# Patient Record
Sex: MALE | Race: WHITE | NOT HISPANIC OR LATINO | Employment: OTHER | URBAN - METROPOLITAN AREA
[De-identification: names, ages, dates, MRNs, and addresses within clinical notes are randomized per-mention and may not be internally consistent; named-entity substitution may affect disease eponyms.]

---

## 2017-05-07 ENCOUNTER — OFFICE VISIT (OUTPATIENT)
Dept: URGENT CARE | Facility: CLINIC | Age: 67
End: 2017-05-07

## 2017-05-07 VITALS
HEIGHT: 70 IN | WEIGHT: 216 LBS | DIASTOLIC BLOOD PRESSURE: 82 MMHG | TEMPERATURE: 98.1 F | OXYGEN SATURATION: 96 % | BODY MASS INDEX: 30.92 KG/M2 | RESPIRATION RATE: 16 BRPM | SYSTOLIC BLOOD PRESSURE: 132 MMHG | HEART RATE: 76 BPM
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
## 2017-05-07 PROCEDURE — 99202 OFFICE O/P NEW SF 15 MIN: CPT | Performed by: PHYSICIAN ASSISTANT

## 2017-05-07 RX ORDER — RAMIPRIL 5 MG/1
5 CAPSULE ORAL DAILY
COMMUNITY

## 2017-05-07 RX ORDER — SIMVASTATIN 20 MG
20 TABLET ORAL NIGHTLY
COMMUNITY

## 2017-05-07 RX ORDER — CEPHALEXIN 500 MG/1
500 CAPSULE ORAL 4 TIMES DAILY
Qty: 28 CAP | Refills: 0 | Status: SHIPPED | OUTPATIENT
Start: 2017-05-07 | End: 2017-05-14

## 2017-05-07 RX ORDER — DIPHENHYDRAMINE HCL 25 MG
25-50 CAPSULE ORAL EVERY 6 HOURS PRN
Qty: 30 CAP | Refills: 0 | Status: SHIPPED | OUTPATIENT
Start: 2017-05-07

## 2017-05-07 RX ORDER — AMLODIPINE BESYLATE 5 MG/1
5 TABLET ORAL DAILY
COMMUNITY

## 2017-05-07 RX ORDER — METOPROLOL TARTRATE 100 MG/1
100 TABLET ORAL 2 TIMES DAILY
COMMUNITY

## 2017-05-07 ASSESSMENT — ENCOUNTER SYMPTOMS
DIARRHEA: 0
FOCAL WEAKNESS: 0
NAUSEA: 0
TINGLING: 0
STRIDOR: 0
SHORTNESS OF BREATH: 0
CHILLS: 0
SENSORY CHANGE: 0
WHEEZING: 0
COUGH: 0
VOMITING: 0
FEVER: 0
ABDOMINAL PAIN: 0

## 2017-05-07 NOTE — MR AVS SNAPSHOT
"        Sandro Diamond   2017 12:15 PM   Office Visit   MRN: [id number]    Department:  [facility]   Dept Phone:  [phone]    Description:  Male : 1950   Provider:  Antonio Chan PA-C           Reason for Visit     Insect Bite           Allergies as of 2017     No Known Allergies      You were diagnosed with     Insect bite, initial encounter   [[id number]]         Vital Signs     Blood Pressure Pulse Temperature Respirations Height Weight    132/82 mmHg 76 36.7 °C (98.1 °F) 16 1.778 m (5' 10\") 97.977 kg (216 lb)    Body Mass Index Oxygen Saturation                30.99 kg/m2 96%          Basic Information     Date Of Birth Sex Race Ethnicity Preferred Language    1950 Male White Non- English      Health Maintenance        Date Due Completion Dates    IMM DTaP/Tdap/Td Vaccine (1 - Tdap) 3/24/1969 ---    COLONOSCOPY 3/24/2000 ---    IMM ZOSTER VACCINE 3/24/2010 ---    IMM PNEUMOCOCCAL 65+ (ADULT) LOW/MEDIUM RISK SERIES (1 of 2 - PCV13) 3/24/2015 ---            Current Immunizations     No immunizations on file.      Below and/or attached are the medications your provider expects you to take. Review all of your home medications and newly ordered medications with your provider and/or pharmacist. Follow medication instructions as directed by your provider and/or pharmacist. Please keep your medication list with you and share with your provider. Update the information when medications are discontinued, doses are changed, or new medications (including over-the-counter products) are added; and carry medication information at all times in the event of emergency situations     Allergies:  No Known Allergies          Medications  Valid as of: May 07, 2017 - 12:44 PM    Generic Name Brand Name Tablet Size Instructions for use    AmLODIPine Besylate (Tab) NORVASC 5 MG Take 5 mg by mouth every day.        Cephalexin (Cap) KEFLEX 500 MG Take 1 Cap by mouth 4 times a day for 7 days.       " DiphenhydrAMINE HCl (Cap) BENADRYL 25 MG Take 1-2 Caps by mouth every 6 hours as needed for Itching or Rash.        Metoprolol Tartrate (Tab) LOPRESSOR 100 MG Take 100 mg by mouth 2 times a day.        Ramipril (Cap) ALTACE 5 MG Take 5 mg by mouth every day.        Rivaroxaban (Tab) XARELTO 20 MG Take 20 mg by mouth with dinner.        Simvastatin (Tab) ZOCOR 20 MG Take 20 mg by mouth every evening.        .                 Medicines prescribed today were sent to:     [facility] [address] [place] [address], [place] - [address]    [address] [place] [address]    Phone: [phone] Fax: [phone]    Open 24 Hours?: No      Medication refill instructions:       If your prescription bottle indicates you have medication refills left, it is not necessary to call your provider’s office. Please contact your pharmacy and they will refill your medication.    If your prescription bottle indicates you do not have any refills left, you may request refills at any time through one of the following ways: The online Spontaneously system (except Urgent Care), by calling your provider’s office, or by asking your pharmacy to contact your provider’s office with a refill request. Medication refills are processed only during regular business hours and may not be available until the next business day. Your provider may request additional information or to have a follow-up visit with you prior to refilling your medication.   *Please Note: Medication refills are assigned a new Rx number when refilled electronically. Your pharmacy may indicate that no refills were authorized even though a new prescription for the same medication is available at the pharmacy. Please request the medicine by name with the pharmacy before contacting your provider for a refill.           Spontaneously Access Code: [email or web address]  Expires: 6/6/2017 12:10 PM    Your email address is not on file at [facility].  Email Addresses are required for you to sign up for  WangYou, please contact [phone] to verify your personal information and to provide your email address prior to attempting to register for WangYou.    Sandro Diamond  [facility], [address], [address]  [facility]    [employer]  A secure, online tool to manage your health information     [employer]’s WangYou® is a secure, online tool that connects you to your personalized health information from the privacy of your home -- day or night - making it very easy for you to manage your healthcare. Once the activation process is completed, you can even access your medical information using the WangYou nader, which is available for free in the Apple Nader store or Google Play store.     To learn more about WangYou, visit [email or web address]    There are two levels of access available (as shown below):   My Chart Features  [facility] Primary Care Doctor [facility]  Specialists [facility]  Urgent  Care Non-[facility] Primary Care Doctor   Email your healthcare team securely and privately 24/7 X X X    Manage appointments: schedule your next appointment; view details of past/upcoming appointments X      Request prescription refills. X      View recent personal medical records, including lab and immunizations X X X X   View health record, including health history, allergies, medications X X X X   Read reports about your outpatient visits, procedures, consult and ER notes X X X X   See your discharge summary, which is a recap of your hospital and/or ER visit that includes your diagnosis, lab results, and care plan X X  X     How to register for WangYou:  Once your e-mail address has been verified, follow the following steps to sign up for WangYou.     1. Go to  [email or web address]  2. Click on the Sign Up Now box, which takes you to the New Member Sign Up page. You will need to provide the following information:  a. Enter your WangYou Access Code exactly as it appears at the top of this page. (You will not need to use this code after you’ve  completed the sign-up process. If you do not sign up before the expiration date, you must request a new code.)   b. Enter your date of birth.   c. Enter your home email address.   d. Click Submit, and follow the next screen’s instructions.  3. Create a Em[employer] ID. This will be your [employer] login ID and cannot be changed, so think of one that is secure and easy to remember.  4. Create a [employer] password. You can change your password at any time.  5. Enter your Password Reset Question and Answer. This can be used at a later time if you forget your password.   6. Enter your e-mail address. This allows you to receive e-mail notifications when new information is available in [employer].  7. Click Sign Up. You can now view your health information.    For assistance activating your [employer] account, call [phone]

## 2017-05-07 NOTE — PROGRESS NOTES
"Subjective:      Sandro Diamond is a 67 y.o. male who presents with Insect Bite            Other  Associated symptoms include a rash. Pertinent negatives include no abdominal pain, chills, coughing, fever, nausea or vomiting.   pt awoke after sleeping in cheap motel w/ bug bite to left wrist, notes increasing redness and swelling to left hand since. Denies fever/chills/changein sensation or weakness. Notes small area of itchy redness c/w bug bite and associated rash. Notes remote PMH of similar w/ lot's of swelling to arm, denies any allergies. Denies PMH of anaphylaxis. Denies any breathingissues/cough/stridor/wheeze or sensation of throat swelling. Is travelling and on road trip from [facility].     Review of Systems   Constitutional: Negative for fever and chills.   Respiratory: Negative for cough, shortness of breath, wheezing and stridor.    Gastrointestinal: Negative for nausea, vomiting, abdominal pain and diarrhea.   Skin: Positive for itching and rash.   Neurological: Negative for tingling, sensory change and focal weakness.       PMH:  has no past medical history on file.  MEDS:   Current outpatient prescriptions:   •  metoprolol (LOPRESSOR) 100 MG Tab, Take 100 mg by mouth 2 times a day., Disp: , Rfl:   •  simvastatin (ZOCOR) 20 MG Tab, Take 20 mg by mouth every evening., Disp: , Rfl:   •  amlodipine (NORVASC) 5 MG Tab, Take 5 mg by mouth every day., Disp: , Rfl:   •  ramipril (ALTACE) 5 MG Cap, Take 5 mg by mouth every day., Disp: , Rfl:   •  rivaroxaban (XARELTO) 20 MG Tab tablet, Take 20 mg by mouth with dinner., Disp: , Rfl:   ALLERGIES: No Known Allergies  SURGHX: No past surgical history on file.  SOCHX:    FH: Family history was reviewed, no pertinent findings to report    I have worn a mask for the entire encounter with this patient.      Objective:     /82 mmHg  Pulse 76  Temp(Src) 36.7 °C (98.1 °F)  Resp 16  Ht 1.778 m (5' 10\")  Wt 97.977 kg (216 lb)  BMI 30.99 kg/m2  SpO2 96% "     Physical Exam   Constitutional: He is oriented to person, place, and time. He appears well-developed and well-nourished. No distress.   HENT:   Head: Normocephalic and atraumatic.   Right Ear: External ear normal.   Left Ear: External ear normal.   Nose: Nose normal.   Eyes: Conjunctivae are normal. Right eye exhibits no discharge. Left eye exhibits no discharge. No scleral icterus.   Neck: Neck supple.   Pulmonary/Chest: Effort normal. No respiratory distress.   Musculoskeletal: Normal range of motion.        Left wrist: He exhibits normal range of motion, no tenderness, no bony tenderness, no swelling, no effusion ( trace edema to all of hand ), no crepitus, no deformity and no laceration.   Neurological: He is alert and oriented to person, place, and time. Coordination normal.   Skin: Skin is warm and dry. Rash noted. He is not diaphoretic. No pallor.   Left wrist w/ two lesions, raised erythematous, mild extension of erythema surrounding lesions, some apparent scabbing from excoriation overlying,  and wife note progression of increased erythema from yesterday to today   Psychiatric: He has a normal mood and affect.   Nursing note and vitals reviewed.              Assessment/Plan:     1. Insect bite, initial encounter  Supportive care is reviewed with patient/caregiver - recommend to push PO fluids and electrolytes, trend resolution of lesion/redness, lines drawn demarcating extension of erythema,  take full course of Rx, take with probiotics, observe for resolution  Return to clinic with lack of resolution or progression of symptoms.  Use benadryl qHs as well  F/u in next few days before leaving town w/ lack of resolution  - cephALEXin (KEFLEX) 500 MG Cap; Take 1 Cap by mouth 4 times a day for 7 days.  Dispense: 28 Cap; Refill: 0  - diphenhydrAMINE (BENADRYL) 25 MG capsule; Take 1-2 Caps by mouth every 6 hours as needed for Itching or Rash.  Dispense: 30 Cap; Refill: 0